# Patient Record
Sex: MALE | URBAN - METROPOLITAN AREA
[De-identification: names, ages, dates, MRNs, and addresses within clinical notes are randomized per-mention and may not be internally consistent; named-entity substitution may affect disease eponyms.]

---

## 2024-10-02 ENCOUNTER — OFFICE VISIT (OUTPATIENT)
Dept: URBAN - METROPOLITAN AREA CLINIC 72 | Facility: CLINIC | Age: 69
End: 2024-10-02
Payer: MEDICARE

## 2024-10-02 ENCOUNTER — DASHBOARD ENCOUNTERS (OUTPATIENT)
Age: 69
End: 2024-10-02

## 2024-10-02 VITALS
TEMPERATURE: 97.2 F | SYSTOLIC BLOOD PRESSURE: 152 MMHG | HEART RATE: 74 BPM | DIASTOLIC BLOOD PRESSURE: 98 MMHG | WEIGHT: 293.6 LBS | BODY MASS INDEX: 39.77 KG/M2 | HEIGHT: 72 IN

## 2024-10-02 DIAGNOSIS — K59.09 CHRONIC CONSTIPATION: ICD-10-CM

## 2024-10-02 DIAGNOSIS — Z86.0100 PERSONAL HISTORY OF COLONIC POLYPS: ICD-10-CM

## 2024-10-02 DIAGNOSIS — K21.9 GERD WITHOUT ESOPHAGITIS: ICD-10-CM

## 2024-10-02 PROBLEM — 236069009: Status: ACTIVE | Noted: 2024-10-02

## 2024-10-02 PROBLEM — 266435005: Status: ACTIVE | Noted: 2024-10-02

## 2024-10-02 PROBLEM — 428283002: Status: ACTIVE | Noted: 2024-10-02

## 2024-10-02 PROCEDURE — 99204 OFFICE O/P NEW MOD 45 MIN: CPT

## 2024-10-02 RX ORDER — AMLODIPINE BESYLATE 10 MG/1
TABLET ORAL
Qty: 100 TABLET | Status: ACTIVE | COMMUNITY

## 2024-10-02 RX ORDER — HYDROCHLOROTHIAZIDE 25 MG/1
TAKE ONE TABLET BY MOUTH ONE TIME DAILY TABLET ORAL
Qty: 90 UNSPECIFIED | Refills: 2 | Status: ACTIVE | COMMUNITY

## 2024-10-02 RX ORDER — BUPROPION HYDROCHLORIDE 150 MG/1
TABLET, EXTENDED RELEASE ORAL
Qty: 90 TABLET | Status: ACTIVE | COMMUNITY

## 2024-10-02 RX ORDER — SEMAGLUTIDE 2.68 MG/ML
INJECTION, SOLUTION SUBCUTANEOUS
Qty: 9 MILLILITER | Status: ACTIVE | COMMUNITY

## 2024-10-02 RX ORDER — OMEPRAZOLE 40 MG/1
CAPSULE, DELAYED RELEASE ORAL
Qty: 100 CAPSULE | Status: ACTIVE | COMMUNITY

## 2024-10-02 RX ORDER — EMPAGLIFLOZIN 25 MG/1
TABLET, FILM COATED ORAL
Qty: 100 TABLET | Status: ACTIVE | COMMUNITY

## 2024-10-02 RX ORDER — CARVEDILOL 25 MG/1
TAKE ONE TABLET BY MOUTH TWICE A DAY WITH BREAKFAST AND DINNER TABLET, FILM COATED ORAL
Qty: 180 UNSPECIFIED | Refills: 2 | Status: ACTIVE | COMMUNITY

## 2024-10-02 RX ORDER — OLOPATADINE HYDROCHLORIDE OPHTHALMIC 2 MG/ML
1 DROP INTO AFFECTED EYE SOLUTION OPHTHALMIC ONCE A DAY
Status: ACTIVE | COMMUNITY
Start: 2024-10-02

## 2024-10-02 RX ORDER — SERTRALINE HYDROCHLORIDE 100 MG/1
2 TABLETS TABLET, FILM COATED ORAL ONCE A DAY
Qty: 60 TABLET | Status: ACTIVE | COMMUNITY
Start: 2024-10-02

## 2024-10-02 NOTE — HPI-TODAY'S VISIT:
Alexander is a 69-year-old male new patient to the practice with no referral in the chart.  Here for possible need for colonoscopy.  There is an office visit from Dr. Kimberly Philippe dated 7/29/2024 in the chart.  Per that visit, colonoscopy with polypectomy, complicated by post polypectomy bleeding dated 2021. Patient said he had 6 polyps removed - one with a double clip that broke loose. He is unsure of last GI doctors name.   No current problems or issues. BM's every 2-3 days. He is on a fiber supplement daily. He states that he took miralax for 4 days and didnt see a response. I advised him to continue miralax for atleast 4 weeks before making assessment. Denies blood in the stool. Denies abd pain. Patient does admit to GERD - takes 40 mg omeprazole daily - has been on it for 15 years. Last EGD approx 8 years ago.   Patient is on Ozempic, and has been for the past year.   Labs: 4/8/2024-CMP normal except for glucose 161 and ALT 62, CBC normal, total cholesterol 296, triglycerides 364, , hep C negative, hemoglobin A1c 7.5

## 2025-01-30 ENCOUNTER — LAB OUTSIDE AN ENCOUNTER (OUTPATIENT)
Dept: URBAN - METROPOLITAN AREA CLINIC 72 | Facility: CLINIC | Age: 70
End: 2025-01-30

## 2025-01-30 ENCOUNTER — TELEPHONE ENCOUNTER (OUTPATIENT)
Dept: URBAN - METROPOLITAN AREA CLINIC 113 | Facility: CLINIC | Age: 70
End: 2025-01-30

## 2025-01-31 ENCOUNTER — OFFICE VISIT (OUTPATIENT)
Dept: URBAN - METROPOLITAN AREA MEDICAL CENTER 40 | Facility: MEDICAL CENTER | Age: 70
End: 2025-01-31